# Patient Record
Sex: FEMALE | Race: WHITE | NOT HISPANIC OR LATINO
[De-identification: names, ages, dates, MRNs, and addresses within clinical notes are randomized per-mention and may not be internally consistent; named-entity substitution may affect disease eponyms.]

---

## 2017-04-28 ENCOUNTER — RECORD ABSTRACTING (OUTPATIENT)
Age: 62
End: 2017-04-28

## 2017-04-28 DIAGNOSIS — Z82.49 FAMILY HISTORY OF ISCHEMIC HEART DISEASE AND OTHER DISEASES OF THE CIRCULATORY SYSTEM: ICD-10-CM

## 2017-04-28 DIAGNOSIS — Z63.4 DISAPPEARANCE AND DEATH OF FAMILY MEMBER: ICD-10-CM

## 2017-04-28 DIAGNOSIS — M06.9 RHEUMATOID ARTHRITIS, UNSPECIFIED: ICD-10-CM

## 2017-04-28 DIAGNOSIS — Z78.9 OTHER SPECIFIED HEALTH STATUS: ICD-10-CM

## 2017-04-28 DIAGNOSIS — K62.5 HEMORRHAGE OF ANUS AND RECTUM: ICD-10-CM

## 2017-04-28 SDOH — SOCIAL STABILITY - SOCIAL INSECURITY: DISSAPEARANCE AND DEATH OF FAMILY MEMBER: Z63.4

## 2017-05-16 ENCOUNTER — APPOINTMENT (OUTPATIENT)
Dept: GASTROENTEROLOGY | Facility: AMBULATORY MEDICAL SERVICES | Age: 62
End: 2017-05-16

## 2017-06-01 ENCOUNTER — APPOINTMENT (OUTPATIENT)
Dept: GASTROENTEROLOGY | Facility: CLINIC | Age: 62
End: 2017-06-01

## 2017-06-01 VITALS
TEMPERATURE: 98.4 F | WEIGHT: 121 LBS | BODY MASS INDEX: 21.44 KG/M2 | SYSTOLIC BLOOD PRESSURE: 120 MMHG | HEIGHT: 63 IN | DIASTOLIC BLOOD PRESSURE: 72 MMHG

## 2017-06-01 DIAGNOSIS — Z82.49 FAMILY HISTORY OF ISCHEMIC HEART DISEASE AND OTHER DISEASES OF THE CIRCULATORY SYSTEM: ICD-10-CM

## 2017-06-01 DIAGNOSIS — Z86.79 PERSONAL HISTORY OF OTHER DISEASES OF THE CIRCULATORY SYSTEM: ICD-10-CM

## 2017-06-01 DIAGNOSIS — Z87.19 PERSONAL HISTORY OF OTHER DISEASES OF THE DIGESTIVE SYSTEM: ICD-10-CM

## 2017-06-02 RX ORDER — OLMESARTAN MEDOXOMIL 40 MG/1
TABLET, FILM COATED ORAL
Refills: 0 | Status: ACTIVE | COMMUNITY

## 2017-06-02 RX ORDER — ROSUVASTATIN CALCIUM 5 MG/1
TABLET, FILM COATED ORAL
Refills: 0 | Status: ACTIVE | COMMUNITY

## 2017-06-02 RX ORDER — METHOTREXATE 2.5 MG/1
TABLET ORAL
Refills: 0 | Status: ACTIVE | COMMUNITY

## 2017-06-02 RX ORDER — EZETIMIBE 10 MG/1
TABLET ORAL
Refills: 0 | Status: ACTIVE | COMMUNITY

## 2017-06-02 RX ORDER — ADALIMUMAB 20MG/0.4ML
KIT SUBCUTANEOUS
Refills: 0 | Status: ACTIVE | COMMUNITY

## 2017-06-02 RX ORDER — CHROMIUM 200 MCG
TABLET ORAL
Refills: 0 | Status: ACTIVE | COMMUNITY

## 2017-10-09 ENCOUNTER — RESULT REVIEW (OUTPATIENT)
Age: 62
End: 2017-10-09

## 2019-08-07 ENCOUNTER — RESULT REVIEW (OUTPATIENT)
Age: 64
End: 2019-08-07

## 2019-12-14 ENCOUNTER — EMERGENCY (EMERGENCY)
Facility: HOSPITAL | Age: 64
LOS: 1 days | Discharge: HOME | End: 2019-12-14
Attending: STUDENT IN AN ORGANIZED HEALTH CARE EDUCATION/TRAINING PROGRAM | Admitting: STUDENT IN AN ORGANIZED HEALTH CARE EDUCATION/TRAINING PROGRAM
Payer: COMMERCIAL

## 2019-12-14 VITALS
DIASTOLIC BLOOD PRESSURE: 75 MMHG | RESPIRATION RATE: 14 BRPM | OXYGEN SATURATION: 99 % | TEMPERATURE: 98 F | HEART RATE: 75 BPM | SYSTOLIC BLOOD PRESSURE: 121 MMHG

## 2019-12-14 VITALS
TEMPERATURE: 98 F | HEIGHT: 63 IN | SYSTOLIC BLOOD PRESSURE: 119 MMHG | OXYGEN SATURATION: 99 % | WEIGHT: 123.02 LBS | HEART RATE: 72 BPM | DIASTOLIC BLOOD PRESSURE: 76 MMHG | RESPIRATION RATE: 14 BRPM

## 2019-12-14 LAB — GLUCOSE BLDC GLUCOMTR-MCNC: 98 MG/DL — SIGNIFICANT CHANGE UP (ref 70–99)

## 2019-12-14 PROCEDURE — 93971 EXTREMITY STUDY: CPT

## 2019-12-14 PROCEDURE — 93971 EXTREMITY STUDY: CPT | Mod: 26,LT

## 2019-12-14 PROCEDURE — 82962 GLUCOSE BLOOD TEST: CPT

## 2019-12-14 PROCEDURE — 99284 EMERGENCY DEPT VISIT MOD MDM: CPT | Mod: 25

## 2019-12-14 PROCEDURE — 99284 EMERGENCY DEPT VISIT MOD MDM: CPT

## 2019-12-14 RX ORDER — PRASUGREL 5 MG/1
1 TABLET, FILM COATED ORAL
Qty: 0 | Refills: 0 | DISCHARGE

## 2019-12-14 NOTE — ED PROVIDER NOTE - MUSCULOSKELETAL, MLM
Spine appears normal, range of motion is not limited, no muscle or joint tenderness. LLE with no swelling, ecchymosis, edema.  Warm, +DP pulses b/l.

## 2019-12-14 NOTE — ED PROVIDER NOTE - CLINICAL SUMMARY MEDICAL DECISION MAKING FREE TEXT BOX
64 year old female with 1 week of LLE cramping,  Started Effient 2 months ago after 2 cardiac stents.  LLE doppler r/o DVT

## 2019-12-14 NOTE — ED PROVIDER NOTE - PATIENT PORTAL LINK FT
You can access the FollowMyHealth Patient Portal offered by [facility] by registering at the following website: [email or web address]. By joining [facility]’s FollowMyHealth portal, you will also be able to view your health information using other applications (apps) compatible with our system.

## 2019-12-14 NOTE — ED PROVIDER NOTE - OBJECTIVE STATEMENT
64 year old female with a history of RA, HTN, HLD, CAD with 2 stents presents with LLE swelling and cramping x 1 week.  Patient assumed it was a "jani horse" and was treating it with massages and Tylenol but the pain persisted.  Denies chest pain, SOB, recent travel, surgery or immobilization.  She had 2 cardiac stents placed at [place] 2 months ago and started Effient.  PMD Dr. Smith 64 year old female with a history of RA, HTN, HLD, CAD with 2 stents presents with LLE swelling and cramping x 1 week.  Patient assumed it was a "jani horse" and was treating it with massages and Tylenol but the pain persisted.  Denies chest pain, SOB, recent travel, surgery or immobilization.  She had 2 cardiac stents placed at [place] 2 months ago and started Effient.  She is concerned that she has a DVT. PMD Dr. Smith

## 2022-11-21 NOTE — ED ADULT TRIAGE NOTE - MEANS OF ARRIVAL
ambulatory [Chaperone Present] : A chaperone was present in the examining room during all aspects of the physical examination [Appropriately responsive] : appropriately responsive [Alert] : alert [No Acute Distress] : no acute distress [No Lymphadenopathy] : no lymphadenopathy [Regular Rate Rhythm] : regular rate rhythm [No Murmurs] : no murmurs [Clear to Auscultation B/L] : clear to auscultation bilaterally [Soft] : soft [Non-tender] : non-tender [Non-distended] : non-distended [No HSM] : No HSM [No Lesions] : no lesions [No Mass] : no mass [Oriented x3] : oriented x3 [Examination Of The Breasts] : a normal appearance [No Masses] : no breast masses were palpable [Labia Majora] : normal [Labia Minora] : normal [Normal] : normal [Uterine Adnexae] : normal

## 2023-11-30 PROBLEM — I25.10 ATHEROSCLEROTIC HEART DISEASE OF NATIVE CORONARY ARTERY WITHOUT ANGINA PECTORIS: Chronic | Status: ACTIVE | Noted: 2019-12-14

## 2024-01-02 ENCOUNTER — NON-APPOINTMENT (OUTPATIENT)
Age: 69
End: 2024-01-02

## 2024-01-30 NOTE — ED ADULT NURSE NOTE - NS ED NOTE ABUSE SUSPICION NEGLECT YN
While rescheduling patient due to Dr. Christian not being in office on 2/22/24, patient was wondering if she needed labs prior to her new appointment which is on 2/13/24. If so she would like a call back so she can get scheduled.    No

## 2024-02-08 ENCOUNTER — APPOINTMENT (OUTPATIENT)
Dept: SURGERY | Facility: CLINIC | Age: 69
End: 2024-02-08

## 2024-03-06 ENCOUNTER — APPOINTMENT (OUTPATIENT)
Dept: GASTROENTEROLOGY | Facility: CLINIC | Age: 69
End: 2024-03-06
Payer: MEDICARE

## 2024-03-06 VITALS
WEIGHT: 125 LBS | BODY MASS INDEX: 22.15 KG/M2 | TEMPERATURE: 96.8 F | HEIGHT: 63 IN | SYSTOLIC BLOOD PRESSURE: 120 MMHG | OXYGEN SATURATION: 98 % | DIASTOLIC BLOOD PRESSURE: 74 MMHG | HEART RATE: 72 BPM

## 2024-03-06 DIAGNOSIS — A04.8 OTHER SPECIFIED BACTERIAL INTESTINAL INFECTIONS: ICD-10-CM

## 2024-03-06 PROCEDURE — 99203 OFFICE O/P NEW LOW 30 MIN: CPT

## 2024-03-06 RX ORDER — CHROMIUM 200 MCG
TABLET ORAL
Refills: 0 | Status: ACTIVE | COMMUNITY

## 2024-03-06 RX ORDER — POLYETHYLENE GLYCOL-3350 AND ELECTROLYTES 236; 6.74; 5.86; 2.97; 22.74 G/274.31G; G/274.31G; G/274.31G; G/274.31G; G/274.31G
236 POWDER, FOR SOLUTION ORAL
Qty: 1 | Refills: 0 | Status: ACTIVE | COMMUNITY
Start: 2024-03-06 | End: 1900-01-01

## 2024-03-06 NOTE — PHYSICAL EXAM
[Alert] : alert [Normal Voice/Communication] : normal voice/communication [Healthy Appearing] : healthy appearing [No Acute Distress] : no acute distress [Sclera] : the sclera and conjunctiva were normal [Hearing Threshold Finger Rub Not [place]] : hearing was normal [Normal Appearance] : the appearance of the neck was normal [Normal Lips/Gums] : the lips and gums were normal [Oropharynx] : the oropharynx was normal [No Respiratory Distress] : no respiratory distress [No Neck Mass] : no neck mass was observed [No Acc Muscle Use] : no accessory muscle use [Auscultation Breath Sounds / Voice Sounds] : lungs were clear to auscultation bilaterally [Respiration, Rhythm And Depth] : normal respiratory rhythm and effort [Normal S1, S2] : normal S1 and S2 [Heart Rate And Rhythm] : heart rate was normal and rhythm regular [Murmurs] : no murmurs [Bowel Sounds] : normal bowel sounds [Abdomen Soft] : soft [Abdomen Tenderness] : non-tender [No Masses] : no abdominal mass palpated [Oriented To Time, Place, And Person] : oriented to person, place, and time [] : no hepatosplenomegaly

## 2024-03-06 NOTE — HISTORY OF PRESENT ILLNESS
[FreeTextEntry1] : Patient came to the office today to arrange for upper endoscopy and colonoscopy.  She has a history of previous H. pylori infection chronic dyspepsia and is desirous of screening colonoscopy.  The patient denies current nausea vomiting fever chills rectal bleeding or melena.  She was diagnosed with rheumatoid arthritis and is being followed by rheumatology for this reason.

## 2024-03-06 NOTE — ASSESSMENT
[FreeTextEntry1] : Impression and plan Patient came to the office today to arrange for upper endoscopy and colonoscopy.  Dyspepsia and history of H. pylori infection as well as screening    the risks benefits alternatives and limitation procedure were discussed.  I will advise after above is complete.

## 2024-04-08 RX ORDER — POLYETHYLENE GLYCOL-3350 AND ELECTROLYTES 236; 6.74; 5.86; 2.97; 22.74 G/274.31G; G/274.31G; G/274.31G; G/274.31G; G/274.31G
236 POWDER, FOR SOLUTION ORAL
Qty: 1 | Refills: 0 | Status: ACTIVE | COMMUNITY
Start: 2024-04-08 | End: 1900-01-01

## 2024-04-25 ENCOUNTER — APPOINTMENT (OUTPATIENT)
Dept: GASTROENTEROLOGY | Facility: AMBULATORY MEDICAL SERVICES | Age: 69
End: 2024-04-25
Payer: MEDICARE

## 2024-04-25 PROCEDURE — 43235 EGD DIAGNOSTIC BRUSH WASH: CPT | Mod: 59

## 2024-04-25 PROCEDURE — 45378 DIAGNOSTIC COLONOSCOPY: CPT | Mod: PT

## 2024-05-23 NOTE — ED PROVIDER NOTE - AREA
Subjective   History of Present Illness  Patient is a 42-year-old female who presents to the ER with complaints of low back pain.  She states that she has had low back pain for the past few weeks and much worse pain today after attempting to put clothing on and felt a sharp pain to the low back.  She complains of pain to the middle of her low back without any radicular symptoms.  She denies any loss of bowel or bladder control or saddle anesthesia.  She denies any known injury.  She has had no difficulty with urination.  She has no previous injury to her low back.  At this point she is having difficulty walking and moving forward in the chair without significant pain to her low back.  Patient has had no fevers, cough or congestion, chest or abdominal pain, urinary symptoms, no other complaints.  No significant past medical history listed in the chart at time of dictation.        Review of Systems   Constitutional: Negative.  Negative for fever.   HENT: Negative.  Negative for congestion.    Respiratory: Negative.  Negative for cough and shortness of breath.    Cardiovascular: Negative.  Negative for chest pain.   Gastrointestinal: Negative.  Negative for abdominal pain, constipation, diarrhea, nausea and vomiting.   Genitourinary: Negative.  Negative for decreased urine volume, difficulty urinating, dysuria and flank pain.   Musculoskeletal:  Positive for back pain. Negative for neck pain.   Skin: Negative.  Negative for rash and wound.   Neurological: Negative.  Negative for weakness and numbness.   All other systems reviewed and are negative.      History reviewed. No pertinent past medical history.    No Known Allergies    Past Surgical History:   Procedure Laterality Date    TONSILLECTOMY         History reviewed. No pertinent family history.    Social History     Socioeconomic History    Marital status:    Tobacco Use    Smoking status: Never   Substance and Sexual Activity    Alcohol use: No    Drug  use: No           Objective   Physical Exam  Vitals and nursing note reviewed.   Constitutional:       General: She is in acute distress.      Appearance: She is well-developed.   HENT:      Head: Normocephalic and atraumatic.      Right Ear: External ear normal.      Left Ear: External ear normal.      Nose: Nose normal.      Mouth/Throat:      Pharynx: Oropharynx is clear.   Eyes:      Extraocular Movements: Extraocular movements intact.      Conjunctiva/sclera: Conjunctivae normal.   Cardiovascular:      Rate and Rhythm: Normal rate and regular rhythm.      Heart sounds: Normal heart sounds.   Pulmonary:      Effort: Pulmonary effort is normal.      Breath sounds: Normal breath sounds.   Abdominal:      General: Bowel sounds are normal.      Palpations: Abdomen is soft.   Musculoskeletal:         General: Tenderness present. Normal range of motion.      Cervical back: Normal range of motion and neck supple.      Comments: Pain to palpation to the lumbar spine without step-off or vertebral point tenderness noted, patient has positive straight leg raises, patient denies any loss of bowel or bladder control or saddle anesthesia, she is neurologically neurovascularly intact, denies any radicular symptoms.   Skin:     General: Skin is warm and dry.   Neurological:      Mental Status: She is alert and oriented to person, place, and time.   Psychiatric:         Mood and Affect: Mood normal.         Behavior: Behavior normal.         Thought Content: Thought content normal.         Judgment: Judgment normal.         Procedures           ED Course  ED Course as of 05/23/24 1639   Thu May 23, 2024   1605 Work up remains pending. This will be a turn over for Arline GUADARRAMA [TW]   1608 Assumed care of pt from Brendon GUADARRAMA. Workup pending.  [CW]   1615 Pt has advised nursing staff that she would prefer oral medications as opposed to injections. Oral medications ordered  [CW]   1629 IMPRESSION:  1. Images obtained in  the decubitus orientation due to patient  discomfort. Mild degenerative changes described above with no acute  lumbar vertebral pathology or malalignment. No prominent central canal  or foraminal stenosis appreciated by CT imaging.     This report was signed and finalized on 5/23/2024 4:03 PM by Dr. Farida Shi MD.   [CW]   1630 CT of the lumbar spine shows no acute fracture.  There is some disc bulges on several levels.  Patient did not want IM medications and asked for oral meds.  She was given Flexeril, Percocet, and prednisone.  She also was given Zofran for nausea.  Patient does not have a primary care provider.  Advised she may need further treatment such as physical therapy.  I will provide patient a list of primary care providers.  I will send the patient home with steroids, pain medication, muscle relaxant, and antiemetics.  Advised to apply ice to the affected areas.  Advised the patient to follow-up with primary care provider next week.  Return emergency department she has increased pain.  Patient is in agreement with the care plan and voices understanding of instructions. [CW]      ED Course User Index  [CW] Arline Garza APRN  [TW] Mile Chandra APRN                                             Medical Decision Making  Patient is a 42-year-old female who presents to the ER with complaints of low back pain.  She states that she has had low back pain for the past few weeks and much worse pain today after attempting to put clothing on and felt a sharp pain to the low back.  She complains of pain to the middle of her low back without any radicular symptoms.  She denies any loss of bowel or bladder control or saddle anesthesia.  She denies any known injury.  She has had no difficulty with urination.  She has no previous injury to her low back.  At this point she is having difficulty walking and moving forward in the chair without significant pain to her low back.  Patient has had no  fevers, cough or congestion, chest or abdominal pain, urinary symptoms, no other complaints.  No significant past medical history listed in the chart at time of dictation.  Differential diagnosis: Lumbar strain, muscle spasm, spinous fracture, and other    CT Lumbar Spine Without Contrast   Final Result    1. Images obtained in the decubitus orientation due to patient    discomfort. Mild degenerative changes described above with no acute    lumbar vertebral pathology or malalignment. No prominent central canal    or foraminal stenosis appreciated by CT imaging.         This report was signed and finalized on 5/23/2024 4:03 PM by Dr. Farida Shi MD.        CT of the lumbar spine shows no acute fracture.  There is some disc bulges on several levels.  Patient did not want IM medications and asked for oral meds.  She was given Flexeril, Percocet, and prednisone.  She also was given Zofran for nausea.  Patient does not have a primary care provider.  Advised she may need further treatment such as physical therapy.  I will provide patient a list of primary care providers.  I will send the patient home with steroids, pain medication, muscle relaxant, and antiemetics.  Advised to apply ice to the affected areas.  Advised the patient to follow-up with primary care provider next week.  Return emergency department she has increased pain.  Patient is in agreement with the care plan and voices understanding of instructions.        Amount and/or Complexity of Data Reviewed  Labs: ordered. Decision-making details documented in ED Course.  Radiology: ordered. Decision-making details documented in ED Course.    Risk  Prescription drug management.        Final diagnoses:   Strain of lumbar region, initial encounter   DDD (degenerative disc disease), lumbar       ED Disposition  ED Disposition       ED Disposition   Discharge    Condition   Stable    Comment   --               Provider, No Known  [facility]  [place] [address]  [phone]    In 2 days  For follow up         Medication List        New Prescriptions      cyclobenzaprine 10 MG tablet  Commonly known as: FLEXERIL  Take 1 tablet by mouth 3 (Three) Times a Day As Needed for Muscle Spasms.     HYDROcodone-acetaminophen 5-325 MG per tablet  Commonly known as: NORCO  Take 1 tablet by mouth Every 6 (Six) Hours As Needed for Moderate Pain.     methylPREDNISolone 4 MG dose pack  Commonly known as: MEDROL  Take as directed on package instructions.     ondansetron ODT 4 MG disintegrating tablet  Commonly known as: ZOFRAN-ODT  Place 1 tablet on the tongue Every 6 (Six) Hours As Needed for Nausea.               Where to Get Your Medications        These medications were sent to [facility]/pharmacy #[id number] - [place], [place] - [address] AT ACROSS FROM [address] - [phone]  - [phone]   [address] [facility] [address]      Phone: [phone]   cyclobenzaprine 10 MG tablet  HYDROcodone-acetaminophen 5-325 MG per tablet  methylPREDNISolone 4 MG dose pack  ondansetron ODT 4 MG disintegrating tablet            Arline Garza, APRN  05/23/24 8575     lower